# Patient Record
Sex: MALE
[De-identification: names, ages, dates, MRNs, and addresses within clinical notes are randomized per-mention and may not be internally consistent; named-entity substitution may affect disease eponyms.]

---

## 2017-04-17 ENCOUNTER — TRANSCRIPTION ENCOUNTER (OUTPATIENT)
Age: 47
End: 2017-04-17

## 2020-02-11 ENCOUNTER — FORM ENCOUNTER (OUTPATIENT)
Age: 50
End: 2020-02-11

## 2020-02-12 ENCOUNTER — APPOINTMENT (OUTPATIENT)
Dept: RHEUMATOLOGY | Facility: CLINIC | Age: 50
End: 2020-02-12
Payer: COMMERCIAL

## 2020-02-12 ENCOUNTER — APPOINTMENT (OUTPATIENT)
Dept: RADIOLOGY | Facility: CLINIC | Age: 50
End: 2020-02-12

## 2020-02-12 ENCOUNTER — LABORATORY RESULT (OUTPATIENT)
Age: 50
End: 2020-02-12

## 2020-02-12 ENCOUNTER — OUTPATIENT (OUTPATIENT)
Dept: OUTPATIENT SERVICES | Facility: HOSPITAL | Age: 50
LOS: 1 days | End: 2020-02-12
Payer: COMMERCIAL

## 2020-02-12 VITALS
HEART RATE: 77 BPM | OXYGEN SATURATION: 97 % | SYSTOLIC BLOOD PRESSURE: 125 MMHG | BODY MASS INDEX: 23.3 KG/M2 | HEIGHT: 66 IN | WEIGHT: 145 LBS | DIASTOLIC BLOOD PRESSURE: 89 MMHG | TEMPERATURE: 98.9 F

## 2020-02-12 DIAGNOSIS — Z80.9 FAMILY HISTORY OF MALIGNANT NEOPLASM, UNSPECIFIED: ICD-10-CM

## 2020-02-12 DIAGNOSIS — Z82.49 FAMILY HISTORY OF ISCHEMIC HEART DISEASE AND OTHER DISEASES OF THE CIRCULATORY SYSTEM: ICD-10-CM

## 2020-02-12 DIAGNOSIS — Z82.3 FAMILY HISTORY OF STROKE: ICD-10-CM

## 2020-02-12 DIAGNOSIS — Z78.9 OTHER SPECIFIED HEALTH STATUS: ICD-10-CM

## 2020-02-12 PROBLEM — Z00.00 ENCOUNTER FOR PREVENTIVE HEALTH EXAMINATION: Status: ACTIVE | Noted: 2020-02-12

## 2020-02-12 PROCEDURE — 73562 X-RAY EXAM OF KNEE 3: CPT | Mod: 26,RT

## 2020-02-12 PROCEDURE — 99214 OFFICE O/P EST MOD 30 MIN: CPT | Mod: 25

## 2020-02-12 PROCEDURE — 36415 COLL VENOUS BLD VENIPUNCTURE: CPT

## 2020-02-13 LAB
ALBUMIN SERPL ELPH-MCNC: 4.6 G/DL
ALP BLD-CCNC: 78 U/L
ALT SERPL-CCNC: 14 U/L
ANION GAP SERPL CALC-SCNC: 15 MMOL/L
AST SERPL-CCNC: 16 U/L
B BURGDOR IGG+IGM SER QL IB: NORMAL
BASOPHILS # BLD AUTO: 0.06 K/UL
BASOPHILS NFR BLD AUTO: 0.6 %
BILIRUB SERPL-MCNC: 0.4 MG/DL
BUN SERPL-MCNC: 11 MG/DL
C TRACH RRNA SPEC QL NAA+PROBE: NOT DETECTED
CALCIUM SERPL-MCNC: 9.6 MG/DL
CCP AB SER IA-ACNC: <8 UNITS
CHLORIDE SERPL-SCNC: 98 MMOL/L
CO2 SERPL-SCNC: 24 MMOL/L
CREAT SERPL-MCNC: 0.96 MG/DL
CRP SERPL-MCNC: 2.02 MG/DL
EOSINOPHIL # BLD AUTO: 0.19 K/UL
EOSINOPHIL NFR BLD AUTO: 1.9 %
ERYTHROCYTE [SEDIMENTATION RATE] IN BLOOD BY WESTERGREN METHOD: 42 MM/HR
GLUCOSE SERPL-MCNC: 100 MG/DL
HCT VFR BLD CALC: 44 %
HGB BLD-MCNC: 14.4 G/DL
IMM GRANULOCYTES NFR BLD AUTO: 1 %
LYMPHOCYTES # BLD AUTO: 1.98 K/UL
LYMPHOCYTES NFR BLD AUTO: 20.2 %
MAN DIFF?: NORMAL
MCHC RBC-ENTMCNC: 28.8 PG
MCHC RBC-ENTMCNC: 32.7 GM/DL
MCV RBC AUTO: 88 FL
MONOCYTES # BLD AUTO: 0.58 K/UL
MONOCYTES NFR BLD AUTO: 5.9 %
N GONORRHOEA RRNA SPEC QL NAA+PROBE: NOT DETECTED
NEUTROPHILS # BLD AUTO: 6.88 K/UL
NEUTROPHILS NFR BLD AUTO: 70.4 %
PLATELET # BLD AUTO: 347 K/UL
POTASSIUM SERPL-SCNC: 4.3 MMOL/L
PROT SERPL-MCNC: 6.2 G/DL
RBC # BLD: 5 M/UL
RBC # FLD: 12.3 %
RF+CCP IGG SER-IMP: NEGATIVE
RHEUMATOID FACT SER QL: <10 IU/ML
SODIUM SERPL-SCNC: 137 MMOL/L
SOURCE AMPLIFICATION: NORMAL
WBC # FLD AUTO: 9.79 K/UL

## 2020-02-13 NOTE — ASSESSMENT
[FreeTextEntry1] : 50 year old man with right knee pain and swelling for the past week.  Similar to previous episodes in 2014 and 2017, no recent flare since that time.  There does not appear to be a preceding infection or injury noted by patient.  WIll obtain Xray of the right knee today.  Will check labs today as well, including ESR, CRP, CBC, CMP, RF, CCP, B27, and lyme testing.  continue Aleve 440 mg bid with food, follow up in one week to review results, further management pending results.

## 2020-02-13 NOTE — REVIEW OF SYSTEMS
[Joint Swelling] : joint swelling [Joint Pain] : joint pain [Negative] : Heme/Lymph [Skin Lesions] : no skin lesions

## 2020-02-13 NOTE — PHYSICAL EXAM
[General Appearance - Alert] : alert [General Appearance - Well Nourished] : well nourished [General Appearance - In No Acute Distress] : in no acute distress [General Appearance - Well Developed] : well developed [General Appearance - Well-Appearing] : healthy appearing [Sclera] : the sclera and conjunctiva were normal [Exaggerated Use Of Accessory Muscles For Inspiration] : no accessory muscle use [Respiration, Rhythm And Depth] : normal respiratory rhythm and effort [Heart Rate And Rhythm] : heart rate was normal and rhythm regular [Auscultation Breath Sounds / Voice Sounds] : lungs were clear to auscultation bilaterally [Heart Sounds] : normal S1 and S2 [No Spinal Tenderness] : no spinal tenderness [Abnormal Walk] : normal gait [Skin Color & Pigmentation] : normal skin color and pigmentation [] : no rash [Oriented To Time, Place, And Person] : oriented to person, place, and time [Skin Lesions] : no skin lesions [Impaired Insight] : insight and judgment were intact [Affect] : the affect was normal [Mood] : the mood was normal [FreeTextEntry1] : right knee with small effusion, limited Flexon at extreme of rotation, no tenderness, no popliteal fullness or tenderness, full flexion noted.  No other joints involved.

## 2020-02-13 NOTE — HISTORY OF PRESENT ILLNESS
[FreeTextEntry1] : 50 year old man with history of inflammatory arthritis, last episode in Summer of 2017, and previously in Summer of 2017 returns for follow up.\par Patient developed right knee swelling over the pat week, which feels may be related to previous episodes. \par \par Started last Thursday,\par Right knee started to feel different, over the course of last week until  Thursday/ \par Friday woke up with knee puffy, took some aleve, took two capsules twice per day initially\par Over the weekend, felt bigger and more painful but now feeling better\par Yesterday did not need aleve and was able to walk around without limitations\par No pain in the knee, feels stiff, sometimes feels uncomfortable in the night, pain better in the morning with and without aleve\par \par Took aleve this morning\par Had steroid injection in past x 2 \par No preceding infection\par No recent GI illness or GI symptoms\par No history of psoriasis\par No history of Lyme or tick bite\par \par No preceding infections\par Summer of 2018, had microscopic colitis by colonoscopy, cholangitis colitis\par Prescribed oral steroids, resolved\par Recommended to come back after 52 years of age for routine follow up colonoscopy\par \par No family history of IBD\par No family history of psoriasis\par No history of gonorrhea or chlamydia\par No symptoms\par No rashes\par No back pain\par No back stiffness\par \par exercise regularly, lifts weights, ride a bike, tried to ride a bike on Thursday of last week, did not feel knee, usually rides all the day\par \par No fevers, no pain in the knee\par No history of gout\par No family history of gout

## 2020-02-14 ENCOUNTER — TRANSCRIPTION ENCOUNTER (OUTPATIENT)
Age: 50
End: 2020-02-14

## 2020-02-14 LAB — HLA-B27 RELATED AG QL: NORMAL

## 2020-02-15 LAB — URATE SERPL-MCNC: 4.1 MG/DL

## 2020-02-18 ENCOUNTER — APPOINTMENT (OUTPATIENT)
Dept: RHEUMATOLOGY | Facility: CLINIC | Age: 50
End: 2020-02-18
Payer: COMMERCIAL

## 2020-02-18 VITALS
HEIGHT: 66 IN | WEIGHT: 145 LBS | BODY MASS INDEX: 23.3 KG/M2 | DIASTOLIC BLOOD PRESSURE: 76 MMHG | SYSTOLIC BLOOD PRESSURE: 118 MMHG | OXYGEN SATURATION: 98 % | HEART RATE: 73 BPM | TEMPERATURE: 98.2 F

## 2020-02-18 PROCEDURE — 20610 DRAIN/INJ JOINT/BURSA W/O US: CPT

## 2020-02-18 RX ORDER — LIDOCAINE HYDROCHLORIDE 10 MG/ML
1 INJECTION, SOLUTION INFILTRATION; PERINEURAL
Refills: 0 | Status: COMPLETED | OUTPATIENT
Start: 2020-02-18

## 2020-02-18 RX ORDER — METHYLPRED ACET/NACL,ISO-OS/PF 40 MG/ML
40 VIAL (ML) INJECTION
Qty: 1 | Refills: 0 | Status: COMPLETED | OUTPATIENT
Start: 2020-02-18

## 2020-02-18 RX ADMIN — METHYLPREDNISOLONE ACETATE 40 MG/ML: 40 INJECTION, SUSPENSION INTRA-ARTICULAR; INTRALESIONAL; INTRAMUSCULAR; SOFT TISSUE at 00:00

## 2020-02-18 RX ADMIN — LIDOCAINE HYDROCHLORIDE ANHYDROUS 2 %: 10 INJECTION, SOLUTION INFILTRATION at 00:00

## 2020-02-19 LAB
B PERT IGG+IGM PNL SER: ABNORMAL
COLOR FLD: YELLOW
EOSINOPHIL # FLD MANUAL: 0 %
FLUID INTAKE SUBSTANCE CLASS: NORMAL
LYMPHOCYTES # FLD MANUAL: 4 %
MESOTHL CELL NFR FLD: 0 %
MONOS+MACROS NFR FLD MANUAL: 13 %
NEUTS SEG # FLD MANUAL: 83 %
NRBC # FLD: 0
RBC # FLD MANUAL: 1000 /UL
SYCRY CLARITY: ABNORMAL
SYCRY COLOR: YELLOW
SYCRY ID: ABNORMAL
SYCRY TUBE: NORMAL
TOTAL CELLS COUNTED FLD: NORMAL /UL
TUBE TYPE: NORMAL
UNIDENT CELLS NFR FLD MANUAL: 0 %
VARIANT LYMPHS # FLD MANUAL: 0 %

## 2020-02-25 NOTE — PROCEDURE
[Today's Date:] : Date: [unfilled] [Consent Obtained] : written consent was obtained prior to the procedure and is detailed in the patient's record [Patient] : the patient [Diagnostic] : diagnostic  [Therapeutic] : therapeutic [#1 Site: ______] : #1 site identified in the [unfilled] [Ethyl Chloride] : ethyl chloride [___ ml Inj] : [unfilled] ~Uml [1%] : 1%  [Betadine] : betadine solution [22 gauge 1.5 inch] : A 22 gauge 1.5 inch needle was used [___ml of fluid] : [unfilled] ml of fluid was aspirated [Crystal Identification] : crystal identification [Cell Count] : cell count [Gram Stain & Culture] : gram stain and culture [Depomedrol ___ mg] : Depomedrol [unfilled] mg [Tolerated Well] : the patient tolerated the procedure well [No Complications] : there were no complications [Instructions Given] : handouts/patient instructions were given to patient [Patient Instructed to Call] : patient was instructed to call if redness at site, a decrease in range of motion or an increase in pain is noted after procedure. [FreeTextEntry1] : Arthrocentesis\par Risks and benefits were explained.  Informed consent was obtained from the patient for a R knee arthrocentesis.  Area was cleaned with iodine Using a 1 1/2" 22g needle,  110 cc of yellow serous/clear fluid was aspirated, toward the end of aspiration, blood turned blood, subsequently joint was injected with 40mg Depomedrol.   Fluid was sent for cell count, gram stain, cultures for fungal and mycobacterial smear and crystals.  Pt tolerated the procedure well.  There were no immediate complications.\par Recommended to apply ice( not directly to the skin) few times today and c/w NSAIDs\par Instructed to follow up with Dr. Garcia \par \par

## 2020-03-04 LAB — BACTERIA FLD CULT: NORMAL

## 2020-03-10 ENCOUNTER — TRANSCRIPTION ENCOUNTER (OUTPATIENT)
Age: 50
End: 2020-03-10

## 2020-03-12 ENCOUNTER — APPOINTMENT (OUTPATIENT)
Dept: RHEUMATOLOGY | Facility: CLINIC | Age: 50
End: 2020-03-12
Payer: COMMERCIAL

## 2020-03-12 VITALS
OXYGEN SATURATION: 98 % | DIASTOLIC BLOOD PRESSURE: 80 MMHG | TEMPERATURE: 98.9 F | SYSTOLIC BLOOD PRESSURE: 127 MMHG | WEIGHT: 144 LBS | BODY MASS INDEX: 23.14 KG/M2 | HEART RATE: 75 BPM | HEIGHT: 66 IN

## 2020-03-12 DIAGNOSIS — Z87.39 PERSONAL HISTORY OF OTHER DISEASES OF THE MUSCULOSKELETAL SYSTEM AND CONNECTIVE TISSUE: ICD-10-CM

## 2020-03-12 PROCEDURE — 99213 OFFICE O/P EST LOW 20 MIN: CPT

## 2020-03-12 RX ORDER — COLCHICINE 0.6 MG/1
0.6 TABLET ORAL TWICE DAILY
Qty: 60 | Refills: 1 | Status: ACTIVE | COMMUNITY
Start: 2020-03-12 | End: 1900-01-01

## 2020-03-13 NOTE — PHYSICAL EXAM
[Abnormal Walk] : normal gait [General Appearance - Alert] : alert [General Appearance - In No Acute Distress] : in no acute distress [General Appearance - Well Nourished] : well nourished [General Appearance - Well Developed] : well developed [FreeTextEntry1] : right knee with small effusion, limited extension at extreme of rotation, no tenderness, no popliteal fullness or tenderness, decreased flexion noted.  No other joints involved.

## 2020-03-13 NOTE — REVIEW OF SYSTEMS
[Joint Pain] : joint pain [Joint Swelling] : joint swelling [Joint Stiffness] : joint stiffness [Negative] : Heme/Lymph [FreeTextEntry9] : right knee

## 2020-03-13 NOTE — HISTORY OF PRESENT ILLNESS
[FreeTextEntry1] : 50 year old man with history of inflammatory arthritis, last episode in Summer of 2017, and previously in Summer of 2017 returns for follow up.\par Patient developed right knee swelling over the pat week, which feels may be related to previous episodes. \par \par Started last Thursday,\par Right knee started to feel different, over the course of last week until  Thursday/ \par Friday woke up with knee puffy, took some aleve, took two capsules twice per day initially\par Over the weekend, felt bigger and more painful but now feeling better\par Yesterday did not need aleve and was able to walk around without limitations\par No pain in the knee, feels stiff, sometimes feels uncomfortable in the night, pain better in the morning with and without aleve\par \par Took aleve this morning\par Had steroid injection in past x 2 \par No preceding infection\par No recent GI illness or GI symptoms\par No history of psoriasis\par No history of Lyme or tick bite\par \par No preceding infections\par Summer of 2018, had microscopic colitis by colonoscopy, cholangitis colitis\par Prescribed oral steroids, resolved\par Recommended to come back after 52 years of age for routine follow up colonoscopy\par \par No family history of IBD\par No family history of psoriasis\par No history of gonorrhea or chlamydia\par No symptoms\par No rashes\par No back pain\par No back stiffness\par \par Exercise regularly, lifts weights, ride a bike, tried to ride a bike on Thursday of last week, did not feel knee, usually rides all the day\par \par No fevers, no pain in the knee\par No history of gout\par No family history of gout\par \par March 12, 2020\par Patient returns for follow up\par Patient with right knee pain and swelling which started a few days ago.  Now with swelling and difficulty with flexion of the knee\par Took nsaids for pain, naproxen for few days with some benefit\par Reviewed xrays with patient.\par Reviewed labs with patient\par No rashes, no fevers, no chills\par

## 2020-03-13 NOTE — ASSESSMENT
[FreeTextEntry1] : 50 year old man with right knee pain and swelling for the past week.  Patient with recent arthrocentesis and steroid injection 2/18/2020 with initial benefit but increasing pain and stiffness over the past week.  Reviewed xray with patient and recent labs results.  Trial of colchicine 0.6 mg bid, colchicine side effects discussed such as diarrhea, nausea, stomach pain, vomiting and rarely bone marrow suppression.  Trial of medrol dose pack as well as NSAIDs without benefit to date.  Patient will follow up if condition worsens or does not improve.  \par

## 2020-03-18 ENCOUNTER — TRANSCRIPTION ENCOUNTER (OUTPATIENT)
Age: 50
End: 2020-03-18

## 2020-03-18 LAB — FUNGUS FLD CULT: NORMAL

## 2020-03-18 RX ORDER — NAPROXEN 500 MG/1
500 TABLET ORAL
Qty: 60 | Refills: 0 | Status: ACTIVE | COMMUNITY
Start: 2020-02-14 | End: 1900-01-01

## 2020-03-24 ENCOUNTER — TRANSCRIPTION ENCOUNTER (OUTPATIENT)
Age: 50
End: 2020-03-24

## 2020-03-25 RX ORDER — PREDNISONE 10 MG/1
10 TABLET ORAL
Qty: 45 | Refills: 0 | Status: ACTIVE | COMMUNITY
Start: 2020-03-25 | End: 1900-01-01

## 2020-03-25 RX ORDER — METHYLPREDNISOLONE 4 MG/1
4 TABLET ORAL
Qty: 1 | Refills: 1 | Status: DISCONTINUED | COMMUNITY
Start: 2020-03-12 | End: 2020-03-25

## 2020-04-08 ENCOUNTER — TRANSCRIPTION ENCOUNTER (OUTPATIENT)
Age: 50
End: 2020-04-08

## 2020-04-08 LAB — ACID FAST STN FLD: NORMAL

## 2020-04-13 ENCOUNTER — TRANSCRIPTION ENCOUNTER (OUTPATIENT)
Age: 50
End: 2020-04-13

## 2020-04-22 ENCOUNTER — TRANSCRIPTION ENCOUNTER (OUTPATIENT)
Age: 50
End: 2020-04-22

## 2020-04-22 ENCOUNTER — NON-APPOINTMENT (OUTPATIENT)
Age: 50
End: 2020-04-22

## 2020-05-04 ENCOUNTER — TRANSCRIPTION ENCOUNTER (OUTPATIENT)
Age: 50
End: 2020-05-04

## 2020-05-11 ENCOUNTER — TRANSCRIPTION ENCOUNTER (OUTPATIENT)
Age: 50
End: 2020-05-11

## 2020-05-12 ENCOUNTER — APPOINTMENT (OUTPATIENT)
Dept: RHEUMATOLOGY | Facility: CLINIC | Age: 50
End: 2020-05-12
Payer: COMMERCIAL

## 2020-05-12 DIAGNOSIS — M19.90 UNSPECIFIED OSTEOARTHRITIS, UNSPECIFIED SITE: ICD-10-CM

## 2020-05-12 DIAGNOSIS — M11.261 OTHER CHONDROCALCINOSIS, RIGHT KNEE: ICD-10-CM

## 2020-05-12 DIAGNOSIS — M25.469 EFFUSION, UNSPECIFIED KNEE: ICD-10-CM

## 2020-05-12 PROCEDURE — 99213 OFFICE O/P EST LOW 20 MIN: CPT | Mod: 95

## 2020-05-12 NOTE — ASSESSMENT
[FreeTextEntry1] : 50 year old man with right knee pain and swelling, s/p arthrocentesis and steroid injection 2/18/2020 with initial benefit but increasing pain and stiffness.  Symptoms are steroid responsive, but recurs with decreased prednisone dose to less than 10 mg qday.  Not improved with colchicine or NSAIDs.  Will again try to taper prednisone slowly, by 1 mg every 4-5 days as tolerated.  Discussed addition of plaquenil as well, patient is hesitant about potential cardiac side effects, discussed with patient.  He will think about it and then will further discuss.

## 2020-05-12 NOTE — HISTORY OF PRESENT ILLNESS
[FreeTextEntry1] : Knee feels much better with prednisone 10 mg \par When decreases to 5 mg, feels increase stiffness and swelling\par Haven't been able to go for walk this morning yet due to schedule, but already feels swollen and tight\par The day before yesterday, the knee felt more flexible, swelling is less, feels softer, today feels more swollen.  When takes prednisone at 10 mg, feels better but doesn't go away fully\par Degree to which notice an improvement\par No naproxen at present\par No colchicine at present\par No fevers, no chills\par No erythema, no other joints involved\par Discussed treatment options such as plaquenil.  Tried sulfasalazine in past with allergy.\par

## 2020-05-12 NOTE — REVIEW OF SYSTEMS
[Arthralgias] : arthralgias [Joint Stiffness] : joint stiffness [Joint Swelling] : joint swelling [Negative] : Heme/Lymph [FreeTextEntry9] : right knee

## 2020-05-12 NOTE — PHYSICAL EXAM
[General Appearance - Well-Appearing] : healthy appearing [General Appearance - In No Acute Distress] : in no acute distress [General Appearance - Alert] : alert [] : normal voice and communication [Exaggerated Use Of Accessory Muscles For Inspiration] : no accessory muscle use [Respiration, Rhythm And Depth] : normal respiratory rhythm and effort [Oriented To Time, Place, And Person] : oriented to person, place, and time [Impaired Insight] : insight and judgment were intact [Affect] : the affect was normal [Mood] : the mood was normal [FreeTextEntry1] : right knee, lateral knee swelling, decreased flexion to about 100-110', very minimal decreased extension.  No discomfort when patient presses on the knee.

## 2020-05-14 ENCOUNTER — TRANSCRIPTION ENCOUNTER (OUTPATIENT)
Age: 50
End: 2020-05-14

## 2020-05-28 ENCOUNTER — TRANSCRIPTION ENCOUNTER (OUTPATIENT)
Age: 50
End: 2020-05-28

## 2020-05-29 ENCOUNTER — TRANSCRIPTION ENCOUNTER (OUTPATIENT)
Age: 50
End: 2020-05-29

## 2020-05-29 RX ORDER — PREDNISONE 1 MG/1
1 TABLET ORAL
Qty: 240 | Refills: 0 | Status: ACTIVE | COMMUNITY
Start: 2020-05-12 | End: 1900-01-01

## 2020-06-15 RX ORDER — HYDROXYCHLOROQUINE SULFATE 200 MG/1
200 TABLET, FILM COATED ORAL
Qty: 60 | Refills: 2 | Status: ACTIVE | COMMUNITY
Start: 2020-05-12 | End: 1900-01-01

## 2020-07-09 ENCOUNTER — TRANSCRIPTION ENCOUNTER (OUTPATIENT)
Age: 50
End: 2020-07-09

## 2020-09-08 DIAGNOSIS — M25.561 PAIN IN RIGHT KNEE: ICD-10-CM

## 2020-11-13 ENCOUNTER — TRANSCRIPTION ENCOUNTER (OUTPATIENT)
Age: 50
End: 2020-11-13

## 2024-09-30 ENCOUNTER — NON-APPOINTMENT (OUTPATIENT)
Age: 54
End: 2024-09-30

## 2024-10-01 ENCOUNTER — LABORATORY RESULT (OUTPATIENT)
Age: 54
End: 2024-10-01

## 2024-10-01 ENCOUNTER — APPOINTMENT (OUTPATIENT)
Dept: RHEUMATOLOGY | Facility: CLINIC | Age: 54
End: 2024-10-01
Payer: COMMERCIAL

## 2024-10-01 ENCOUNTER — APPOINTMENT (OUTPATIENT)
Dept: RADIOLOGY | Facility: CLINIC | Age: 54
End: 2024-10-01
Payer: COMMERCIAL

## 2024-10-01 ENCOUNTER — OUTPATIENT (OUTPATIENT)
Dept: OUTPATIENT SERVICES | Facility: HOSPITAL | Age: 54
LOS: 1 days | End: 2024-10-01

## 2024-10-01 VITALS
TEMPERATURE: 98.1 F | HEIGHT: 66 IN | WEIGHT: 138 LBS | OXYGEN SATURATION: 98 % | BODY MASS INDEX: 22.18 KG/M2 | SYSTOLIC BLOOD PRESSURE: 115 MMHG | HEART RATE: 72 BPM | DIASTOLIC BLOOD PRESSURE: 77 MMHG

## 2024-10-01 DIAGNOSIS — M19.90 UNSPECIFIED OSTEOARTHRITIS, UNSPECIFIED SITE: ICD-10-CM

## 2024-10-01 PROCEDURE — 73564 X-RAY EXAM KNEE 4 OR MORE: CPT | Mod: 26,LT,RT

## 2024-10-01 PROCEDURE — 72190 X-RAY EXAM OF PELVIS: CPT | Mod: 26

## 2024-10-01 PROCEDURE — G2211 COMPLEX E/M VISIT ADD ON: CPT

## 2024-10-01 PROCEDURE — 99205 OFFICE O/P NEW HI 60 MIN: CPT

## 2024-10-01 RX ORDER — PREDNISONE 10 MG/1
10 TABLET ORAL DAILY
Qty: 1 | Refills: 0 | Status: DISCONTINUED | COMMUNITY
Start: 2024-10-01 | End: 2024-10-01

## 2024-10-01 NOTE — PHYSICAL EXAM
[General Appearance - In No Acute Distress] : in no acute distress [PERRL With Normal Accommodation] : pupils were equal in size, round, and reactive to light [Outer Ear] : the ears and nose were normal in appearance [Hearing Threshold Finger Rub Not [place]] : hearing was normal [Both Tympanic Membranes Were Examined] : both tympanic membranes were normal [Neck Appearance] : the appearance of the neck was normal [Respiration, Rhythm And Depth] : normal respiratory rhythm and effort [Auscultation Breath Sounds / Voice Sounds] : lungs were clear to auscultation bilaterally [Chest Palpation] : palpation of the chest revealed no abnormalities [Heart Rate And Rhythm] : heart rate was normal and rhythm regular [Heart Sounds] : normal S1 and S2 [Murmurs] : no murmurs [Edema] : there was no peripheral edema [Bowel Sounds] : normal bowel sounds [Abdomen Soft] : soft [Abdomen Tenderness] : non-tender [No Spinal Tenderness] : no spinal tenderness [Motor Tone] : muscle strength and tone were normal [FreeTextEntry1] : L knee mild swelling and warm to touch no redness and full range of motion  [] : no rash

## 2024-10-01 NOTE — ASSESSMENT
[FreeTextEntry1] : 54 y old M with of Recurrent Knee pain and swelling R knee since age of 34 with intermittent steroids injections and most recently worse L knee pain and swelling that started since 8/24 with workup in the past arthrocentesis of R knee 2/2020 had inflammatory synovial fluid with WBC 13K and + CPPD crystals suggestive pseudogout  -was tx with prednisone , also Colchicine, states pain and swelling recurred also was on HCQ For up to 8 weeks stopped as does not think it helped -has had one episode of uveitis and microscopic colitis- no recurrence -with workup in the past hand mildly increased ESR and CRP And negative RF, CCP and HLA B 27  -evaluate for pseudogout vs seronegative inflammatory arthritis, suggested prednisone dose pack for mild L knee effusion and swelling mostly improved with NSAID , less likely infectious  -check pelvic x ray to evaluate SI joints, also repeat bl knee x ray to eval for chondrocalcinosis  -follow up one week if no improvement plan Joint steroid injection /aspiration

## 2024-10-01 NOTE — HISTORY OF PRESENT ILLNESS
[FreeTextEntry1] : CC: joint pain and swelling    HPI: 54 y old M with PMH of  R knee pain and swelling for > 20 years started at age if  34 , has been seen by Orthopedics , has had steroid injections partially helped last time at [place] 5 years ago, has had R knee steroid injection by Dr Baum  2/2020  s/p aspiration and injection  was evaluated by Dr Garcia diagnosed with Pseudogout , Tx  Prednisone and Colchicine,  improved symptoms with prednisone,  and HCQ was 8 weeks , no improvement with HCQ  had swollen R knee for 7 month and improved without other symptoms  Had febrile illness  9/2020 Covid 2019 and R knee swelling and pain improved  Also had L knee pain and swelling  8/2024  Once had involvement R elbow   Has had evaluation last time R knee MRI 2013   Has been taking Naproxen as needed , improved partially   No lower back , no skin rash no psoriasis,  2014 had - Uveitis once received steroid drops used to get cancer sores no ulcers since then  no sob no chest pain,  2017 -  Microscopic Colitis - Colonoscopy was on oral steroids no recurrence , had Colonoscopy  2023 outside [facility]  no known tick exposure and never tested positive for it    Workup -  HLA B 27 was negative   2/12/2020 , negative, GC, CCP, Lyme IgG and GM, RF, 2020  CRP 2 , ESR 42  Uric acid 4.1   Synovial fluid evaluation 2/2020  CPP crystals in synovial fluid  WBC  13K   recent blood work      Imaging : R knee x ray  2/12/2020 right knee effusion

## 2024-10-02 ENCOUNTER — TRANSCRIPTION ENCOUNTER (OUTPATIENT)
Age: 54
End: 2024-10-02

## 2024-10-02 LAB
ALBUMIN SERPL ELPH-MCNC: 4.3 G/DL
ALP BLD-CCNC: 82 U/L
ALT SERPL-CCNC: 12 U/L
ANA SER IF-ACNC: NEGATIVE
ANION GAP SERPL CALC-SCNC: 13 MMOL/L
APPEARANCE: CLEAR
AST SERPL-CCNC: 19 U/L
BILIRUB SERPL-MCNC: 0.3 MG/DL
BILIRUBIN URINE: NEGATIVE
BLOOD URINE: NEGATIVE
BUN SERPL-MCNC: 15 MG/DL
C3 SERPL-MCNC: 120 MG/DL
C4 SERPL-MCNC: 32 MG/DL
CALCIUM SERPL-MCNC: 9.6 MG/DL
CHLORIDE SERPL-SCNC: 100 MMOL/L
CO2 SERPL-SCNC: 25 MMOL/L
COLOR: YELLOW
CREAT SERPL-MCNC: 0.95 MG/DL
CRP SERPL-MCNC: 6 MG/L
EGFR: 96 ML/MIN/1.73M2
ERYTHROCYTE [SEDIMENTATION RATE] IN BLOOD BY WESTERGREN METHOD: 19 MM/HR
GLUCOSE QUALITATIVE U: NEGATIVE MG/DL
GLUCOSE SERPL-MCNC: 104 MG/DL
HBV CORE IGM SER QL: NONREACTIVE
HBV SURFACE AB SERPL IA-ACNC: 3.1 MIU/ML
HBV SURFACE AG SER QL: NONREACTIVE
HCV AB SER QL: NONREACTIVE
HCV S/CO RATIO: 0.04 S/CO
IGG SER QL IEP: 296 MG/DL
IGG SER QL IEP: 296 MG/DL
IGG1 SER-MCNC: 222 MG/DL
IGG2 SER-MCNC: 29 MG/DL
IGG3 SER-MCNC: 8.4 MG/DL
IGG4 SER-MCNC: 0.6 MG/DL
IGM SER QL IEP: 80 MG/DL
KETONES URINE: NEGATIVE MG/DL
LEUKOCYTE ESTERASE URINE: NEGATIVE
NITRITE URINE: NEGATIVE
PH URINE: 7.5
POTASSIUM SERPL-SCNC: 5.6 MMOL/L
PROT SERPL-MCNC: 6 G/DL
PROTEIN URINE: NEGATIVE MG/DL
SODIUM SERPL-SCNC: 138 MMOL/L
SPECIFIC GRAVITY URINE: 1.01
T PALLIDUM AB SER QL IA: NEGATIVE
UROBILINOGEN URINE: 0.2 MG/DL

## 2024-10-02 RX ORDER — NAPROXEN 500 MG/1
500 TABLET ORAL
Qty: 60 | Refills: 0 | Status: ACTIVE | COMMUNITY
Start: 2024-10-02 | End: 1900-01-01

## 2024-10-03 LAB
ACE BLD-CCNC: 24 U/L
IGA 24H UR QL IFE: NORMAL

## 2024-10-05 LAB
ALBUMIN MFR SERPL ELPH: 64.1 %
ALBUMIN SERPL-MCNC: 4 G/DL
ALBUMIN/GLOB SERPL: 1.8 RATIO
ALPHA1 GLOB MFR SERPL ELPH: 6.3 %
ALPHA1 GLOB SERPL ELPH-MCNC: 0.4 G/DL
ALPHA2 GLOB MFR SERPL ELPH: 15.1 %
ALPHA2 GLOB SERPL ELPH-MCNC: 0.9 G/DL
B-GLOBULIN MFR SERPL ELPH: 9.4 %
B-GLOBULIN SERPL ELPH-MCNC: 0.6 G/DL
GAMMA GLOB FLD ELPH-MCNC: 0.3 G/DL
GAMMA GLOB MFR SERPL ELPH: 5.1 %
INTERPRETATION SERPL IEP-IMP: NORMAL
M PROTEIN SPEC IFE-MCNC: NORMAL
M TB IFN-G BLD-IMP: NEGATIVE
PROT SERPL-MCNC: 6.2 G/DL
PROT SERPL-MCNC: 6.2 G/DL
QUANTIFERON TB PLUS MITOGEN MINUS NIL: 3.3 IU/ML
QUANTIFERON TB PLUS NIL: 0.04 IU/ML
QUANTIFERON TB PLUS TB1 MINUS NIL: 0.01 IU/ML
QUANTIFERON TB PLUS TB2 MINUS NIL: 0 IU/ML

## 2024-10-11 ENCOUNTER — APPOINTMENT (OUTPATIENT)
Dept: RHEUMATOLOGY | Facility: CLINIC | Age: 54
End: 2024-10-11

## 2024-10-11 VITALS
HEART RATE: 74 BPM | TEMPERATURE: 97.2 F | WEIGHT: 138 LBS | SYSTOLIC BLOOD PRESSURE: 115 MMHG | DIASTOLIC BLOOD PRESSURE: 65 MMHG | HEIGHT: 66 IN | BODY MASS INDEX: 22.18 KG/M2 | OXYGEN SATURATION: 99 %

## 2024-10-11 DIAGNOSIS — D80.1 NONFAMILIAL HYPOGAMMAGLOBULINEMIA: ICD-10-CM

## 2024-10-11 PROCEDURE — 99215 OFFICE O/P EST HI 40 MIN: CPT

## 2024-10-11 PROCEDURE — G2211 COMPLEX E/M VISIT ADD ON: CPT

## 2024-10-11 RX ORDER — COLCHICINE 0.6 MG/1
0.6 TABLET ORAL TWICE DAILY
Qty: 14 | Refills: 1 | Status: ACTIVE | COMMUNITY
Start: 2024-10-11 | End: 1900-01-01

## 2024-10-17 ENCOUNTER — TRANSCRIPTION ENCOUNTER (OUTPATIENT)
Age: 54
End: 2024-10-17

## 2024-10-25 ENCOUNTER — NON-APPOINTMENT (OUTPATIENT)
Age: 54
End: 2024-10-25

## 2024-11-11 ENCOUNTER — APPOINTMENT (OUTPATIENT)
Dept: RHEUMATOLOGY | Facility: CLINIC | Age: 54
End: 2024-11-11

## 2024-11-15 ENCOUNTER — APPOINTMENT (OUTPATIENT)
Dept: RHEUMATOLOGY | Facility: CLINIC | Age: 54
End: 2024-11-15

## 2024-12-17 ENCOUNTER — TRANSCRIPTION ENCOUNTER (OUTPATIENT)
Age: 54
End: 2024-12-17

## 2024-12-20 ENCOUNTER — TRANSCRIPTION ENCOUNTER (OUTPATIENT)
Age: 54
End: 2024-12-20

## 2024-12-24 ENCOUNTER — TRANSCRIPTION ENCOUNTER (OUTPATIENT)
Age: 54
End: 2024-12-24

## 2025-01-29 DIAGNOSIS — M25.469 EFFUSION, UNSPECIFIED KNEE: ICD-10-CM
